# Patient Record
Sex: FEMALE | Race: WHITE | NOT HISPANIC OR LATINO | Employment: STUDENT | URBAN - METROPOLITAN AREA
[De-identification: names, ages, dates, MRNs, and addresses within clinical notes are randomized per-mention and may not be internally consistent; named-entity substitution may affect disease eponyms.]

---
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
## 2023-01-06 ENCOUNTER — MYC MEDICAL ADVICE (OUTPATIENT)
Dept: DERMATOLOGY | Facility: CLINIC | Age: 18
End: 2023-01-06

## 2023-01-06 DIAGNOSIS — L70.0 ACNE VULGARIS: ICD-10-CM

## 2023-01-09 RX ORDER — DROSPIRENONE AND ETHINYL ESTRADIOL 0.02-3(28)
1 KIT ORAL DAILY
Qty: 30 TABLET | Refills: 11 | Status: SHIPPED | OUTPATIENT
Start: 2023-01-09

## 2023-04-16 ENCOUNTER — HEALTH MAINTENANCE LETTER (OUTPATIENT)
Age: 18
End: 2023-04-16

## 2023-08-31 ENCOUNTER — OFFICE VISIT (OUTPATIENT)
Dept: FAMILY MEDICINE | Facility: CLINIC | Age: 18
End: 2023-08-31
Payer: COMMERCIAL

## 2023-08-31 DIAGNOSIS — D22.9 BENIGN NEVUS OF SKIN: Primary | ICD-10-CM

## 2023-08-31 DIAGNOSIS — D23.9 DERMATOFIBROMA: ICD-10-CM

## 2023-08-31 PROCEDURE — 99213 OFFICE O/P EST LOW 20 MIN: CPT | Performed by: PHYSICIAN ASSISTANT

## 2023-08-31 NOTE — LETTER
8/31/2023         RE: Cyn Sarabia  [address] [address]        Dear Colleague,    Thank you for referring your patient, Cyn Sarabia, to the [facility] [place]. Please see a copy of my visit note below.    [facility] Dermatology Note  Encounter Date: Aug 31, 2023  Office Visit      Dermatology Problem List:  1. DF - L upper arm  ____________________________________________    Assessment & Plan:  # Dermatofibroma.   - edu on benign etiology  - if bothersome can try OTC hydrocortisone cream and bandage similar to cordon tape    # Benign mole - L parietal scalp  -reassured    Procedures Performed:   none    Follow-up: prn for new or changing lesions    Staff:     All risks, benefits and alternatives were discussed with patient.  Patient is in agreement and understands the assessment and plan.  All questions were answered.    Marina Duncan PA-C, MPAS  [facility] [place]: Phone: [phone], Fax: [phone]  [facility]: Phone: [phone],  Fax: [phone]  [facility] [place]: Phone: [phone], Fax: [phone]  ____________________________________________    CC: Derm Problem (Check mole on L upper arm and L scalp)      HPI:  Ms. Cyn Sarabia is a 17 year old female who presents today as a new patient for two spots of concern. One on her scalp which has a dark border and a hard spot on the arm which was a mole, but she recalls a bite there which she scratched and it got hard since.     Patient is otherwise feeling well, without additional concerns.    Labs:  none    Physical Exam:  Vitals: There were no vitals taken for this visit.  SKIN: Sun-exposed skin, which includes the head/face, neck, both arms, digits, and/or nails was examined.    - benign appearing macule, brown on the L parietal salp - 6mm   - There is a firm tan/flesh colored papule  that dimples with lateral pressure on the L upper arm..   - No other lesions of concern on areas examined.     Medications:  Current Outpatient Medications   Medication     Acetaminophen (TYLENOL PO)     clindamycin (CLEOCIN T) 1 % external lotion     drospirenone-ethinyl estradiol (LORYNA) 3-0.02 MG tablet     drospirenone-ethinyl estradiol (BHANU) 3-0.02 MG tablet     hydrocortisone 2.5 % ointment     ISOtretinoin (ABSORICA) 30 MG capsule     ISOtretinoin (ABSORICA) 30 MG capsule     ISOtretinoin (ABSORICA) 30 MG capsule     mupirocin (BACTROBAN) 2 % external ointment     MYORISAN 30 MG capsule     tretinoin (RETIN-A) 0.025 % external cream     No current facility-administered medications for this visit.      Past Medical/Surgical History:   Patient Active Problem List   Diagnosis     Common wart     Acne vulgaris     No past medical history on file.                     Again, thank you for allowing me to participate in the care of your patient.        Sincerely,        Marina Duncan PA-C

## 2023-08-31 NOTE — PROGRESS NOTES
[facility] Dermatology Note  Encounter Date: Aug 31, 2023  Office Visit      Dermatology Problem List:  1. DF - L upper arm  ____________________________________________    Assessment & Plan:  # Dermatofibroma.   - edu on benign etiology  - if bothersome can try OTC hydrocortisone cream and bandage similar to cordon tape    # Benign mole - L parietal scalp  -reassured    Procedures Performed:   none    Follow-up: prn for new or changing lesions    Staff:     All risks, benefits and alternatives were discussed with patient.  Patient is in agreement and understands the assessment and plan.  All questions were answered.    Marina Duncan PA-C, MPAS  [facility] [place]: Phone: [phone], Fax: [phone]  [facility]: Phone: [phone],  Fax: [phone]  [facility]: Phone: [phone], Fax: [phone]  ____________________________________________    CC: Derm Problem (Check mole on L upper arm and L scalp)      HPI:  Ms. Cyn Sarabia is a 17 year old female who presents today as a new patient for two spots of concern. One on her scalp which has a dark border and a hard spot on the arm which was a mole, but she recalls a bite there which she scratched and it got hard since.     Patient is otherwise feeling well, without additional concerns.    Labs:  none    Physical Exam:  Vitals: There were no vitals taken for this visit.  SKIN: Sun-exposed skin, which includes the head/face, neck, both arms, digits, and/or nails was examined.    - benign appearing macule, brown on the L parietal salp - 6mm   - There is a firm tan/flesh colored papule that dimples with lateral pressure on the L upper arm..   - No other lesions of concern on areas examined.     Medications:  Current Outpatient Medications   Medication    Acetaminophen (TYLENOL PO)    clindamycin (CLEOCIN T) 1 % external lotion     drospirenone-ethinyl estradiol (LORYNA) 3-0.02 MG tablet    drospirenone-ethinyl estradiol (BHANU) 3-0.02 MG tablet    hydrocortisone 2.5 % ointment    ISOtretinoin (ABSORICA) 30 MG capsule    ISOtretinoin (ABSORICA) 30 MG capsule    ISOtretinoin (ABSORICA) 30 MG capsule    mupirocin (BACTROBAN) 2 % external ointment    MYORISAN 30 MG capsule    tretinoin (RETIN-A) 0.025 % external cream     No current facility-administered medications for this visit.      Past Medical/Surgical History:   Patient Active Problem List   Diagnosis    Common wart    Acne vulgaris     No past medical history on file.

## 2024-06-22 ENCOUNTER — HEALTH MAINTENANCE LETTER (OUTPATIENT)
Age: 19
End: 2024-06-22

## 2025-01-07 ENCOUNTER — LAB REQUISITION (OUTPATIENT)
Dept: LAB | Facility: CLINIC | Age: 20
End: 2025-01-07
Payer: COMMERCIAL

## 2025-01-07 DIAGNOSIS — Z11.8 ENCOUNTER FOR SCREENING FOR OTHER INFECTIOUS AND PARASITIC DISEASES: ICD-10-CM

## 2025-01-07 DIAGNOSIS — Z01.419 ENCOUNTER FOR GYNECOLOGICAL EXAMINATION (GENERAL) (ROUTINE) WITHOUT ABNORMAL FINDINGS: ICD-10-CM

## 2025-01-07 DIAGNOSIS — Z11.59 ENCOUNTER FOR SCREENING FOR OTHER VIRAL DISEASES: ICD-10-CM

## 2025-01-07 DIAGNOSIS — Z11.4 ENCOUNTER FOR SCREENING FOR HUMAN IMMUNODEFICIENCY VIRUS (HIV): ICD-10-CM

## 2025-01-07 LAB
HBV SURFACE AG SERPL QL IA: NONREACTIVE
HCV AB SERPL QL IA: NONREACTIVE
HIV 1+2 AB+HIV1 P24 AG SERPL QL IA: NONREACTIVE
HOLD SPECIMEN: NORMAL

## 2025-01-07 PROCEDURE — 86803 HEPATITIS C AB TEST: CPT | Mod: ORL

## 2025-01-07 PROCEDURE — G0145 SCR C/V CYTO,THINLAYER,RESCR: HCPCS | Mod: ORL

## 2025-01-07 PROCEDURE — 87389 HIV-1 AG W/HIV-1&-2 AB AG IA: CPT | Mod: ORL

## 2025-01-07 PROCEDURE — 86780 TREPONEMA PALLIDUM: CPT | Mod: ORL

## 2025-01-07 PROCEDURE — 87340 HEPATITIS B SURFACE AG IA: CPT | Mod: ORL

## 2025-01-07 PROCEDURE — 87491 CHLMYD TRACH DNA AMP PROBE: CPT | Mod: ORL

## 2025-01-08 LAB
C TRACH DNA SPEC QL PROBE+SIG AMP: NEGATIVE
N GONORRHOEA DNA SPEC QL NAA+PROBE: NEGATIVE
SPECIMEN TYPE: NORMAL
T PALLIDUM AB SER QL: NONREACTIVE

## 2025-01-10 LAB
BKR LAB AP GYN ADEQUACY: NORMAL
BKR LAB AP GYN INTERPRETATION: NORMAL
BKR LAB AP HPV REFLEX: NORMAL
BKR LAB AP LMP: NORMAL
BKR LAB AP PREVIOUS ABNL DX: NORMAL
BKR LAB AP PREVIOUS ABNORMAL: NORMAL
PATH REPORT.COMMENTS IMP SPEC: NORMAL
PATH REPORT.COMMENTS IMP SPEC: NORMAL
PATH REPORT.RELEVANT HX SPEC: NORMAL

## 2025-01-13 ENCOUNTER — VIRTUAL VISIT (OUTPATIENT)
Dept: FAMILY MEDICINE | Facility: OTHER | Age: 20
End: 2025-01-13
Payer: COMMERCIAL

## 2025-01-13 DIAGNOSIS — R05.2 SUBACUTE COUGH: Primary | ICD-10-CM

## 2025-01-13 DIAGNOSIS — Z20.818 PERTUSSIS EXPOSURE: ICD-10-CM

## 2025-01-13 PROCEDURE — 98005 SYNCH AUDIO-VIDEO EST LOW 20: CPT | Performed by: PHYSICIAN ASSISTANT

## 2025-01-13 RX ORDER — AZITHROMYCIN 250 MG/1
TABLET, FILM COATED ORAL
Qty: 6 TABLET | Refills: 0 | Status: SHIPPED | OUTPATIENT
Start: 2025-01-13 | End: 2025-01-18

## 2025-01-13 RX ORDER — ESCITALOPRAM OXALATE 20 MG/1
10 TABLET ORAL DAILY
COMMUNITY
Start: 2025-01-13

## 2025-01-13 RX ORDER — ESCITALOPRAM OXALATE 20 MG/1
20 TABLET ORAL DAILY
COMMUNITY
End: 2025-01-13

## 2025-01-13 ASSESSMENT — ENCOUNTER SYMPTOMS: COUGH: 1

## 2025-01-13 NOTE — PROGRESS NOTES
Cyn is a 19 year old who is being evaluated via a billable video visit.    How would you like to obtain your AVS? MyChart  If the video visit is dropped, the invitation should be resent by: Text to cell phone: [phone]  Will anyone else be joining your video visit? No      Assessment & Plan     Subacute cough  Pertussis exposure  Patient is a 19 year old female who presents via video visit with history of cough for 2 months and sister who tested positive for pertussis this past week. I discussed starting azithromycin treatment in patient given her history and exposure. It is likely that patient has had pertussis and exposed sister. Patient was educated on the possible adverse effects of the medication as well as to reach out if not improving as expected. Reference material attached to the AVS.   - azithromycin (ZITHROMAX) 250 MG tablet; Take 2 tablets (500 mg) by mouth daily for 1 day, THEN 1 tablet (250 mg) daily for 4 days.      Subjective   Cyn is a 19 year old, presenting for the following health issues:  Video Visit and Cough (Sibling tested positive for whooping cough)      1/13/2025     3:15 PM   Additional Questions   Roomed by Jamey     Cough    History of Present Illness       Reason for visit:  Cough - exposure to pertussis  Symptoms include:  Cough x 2 months  Symptom progression:  Improving  What makes it worse:  No  What makes it better:  No        Sister tested positive for pertussis 1 week ago.  Peak of patient's symptoms were in Nov 2024.       Review of Systems  Constitutional, HEENT, cardiovascular, pulmonary, gi and gu systems are negative, except as otherwise noted.      Objective    Vitals - Patient Reported  Weight (Patient Reported): 145 lb (65.8 kg)        Physical Exam   GENERAL: alert and no distress  EYES: Eyes grossly normal to inspection.  No discharge or erythema, or obvious scleral/conjunctival abnormalities.  RESP: No audible wheeze, cough, or visible cyanosis.    SKIN:  Visible skin clear. No significant rash, abnormal pigmentation or lesions.  NEURO: Cranial nerves grossly intact.  Mentation and speech appropriate for age.  PSYCH: Appropriate affect, tone, and pace of words        Video-Visit Details    Type of service:  Video Visit   Joined the call at 1/13/2025, 3:38:51 pm.  Left the call at 1/13/2025, 3:48:14 pm.  You were on the call for 9 minutes 22 seconds .  Originating Location (pt. Location): Home    Distant Location (provider location):  On-site  Platform used for Video Visit: Irene  Signed Electronically by: Saul Alcala PA-C